# Patient Record
Sex: FEMALE | Race: WHITE | NOT HISPANIC OR LATINO | URBAN - METROPOLITAN AREA
[De-identification: names, ages, dates, MRNs, and addresses within clinical notes are randomized per-mention and may not be internally consistent; named-entity substitution may affect disease eponyms.]

---

## 2022-09-22 ENCOUNTER — HOSPITAL ENCOUNTER (EMERGENCY)
Facility: HOSPITAL | Age: 42
Discharge: HOME | End: 2022-09-22
Attending: EMERGENCY MEDICINE
Payer: MEDICARE

## 2022-09-22 VITALS
DIASTOLIC BLOOD PRESSURE: 67 MMHG | TEMPERATURE: 98.7 F | RESPIRATION RATE: 17 BRPM | OXYGEN SATURATION: 98 % | SYSTOLIC BLOOD PRESSURE: 118 MMHG | HEART RATE: 89 BPM

## 2022-09-22 DIAGNOSIS — K02.9 PAIN DUE TO DENTAL CARIES: ICD-10-CM

## 2022-09-22 DIAGNOSIS — K04.7 DENTAL INFECTION: Primary | ICD-10-CM

## 2022-09-22 DIAGNOSIS — K08.89 TOOTHACHE: ICD-10-CM

## 2022-09-22 PROCEDURE — 99284 EMERGENCY DEPT VISIT MOD MDM: CPT | Performed by: PHYSICIAN ASSISTANT

## 2022-09-22 PROCEDURE — 99282 EMERGENCY DEPT VISIT SF MDM: CPT

## 2022-09-22 RX ORDER — AMOXICILLIN 500 MG/1
500 CAPSULE ORAL 3 TIMES DAILY
Qty: 21 CAPSULE | Refills: 0 | Status: SHIPPED | OUTPATIENT
Start: 2022-09-22 | End: 2022-09-29

## 2022-09-22 RX ORDER — IBUPROFEN 600 MG/1
600 TABLET ORAL EVERY 6 HOURS PRN
Qty: 30 TABLET | Refills: 0 | Status: SHIPPED | OUTPATIENT
Start: 2022-09-22 | End: 2022-10-02

## 2022-09-22 RX ORDER — LIDOCAINE HYDROCHLORIDE 20 MG/ML
SOLUTION OROPHARYNGEAL
Qty: 100 ML | Refills: 0 | Status: SHIPPED | OUTPATIENT
Start: 2022-09-22

## 2022-09-22 NOTE — ED PROVIDER NOTES
History  Chief Complaint   Patient presents with    Dental Pain     Pt reports cracked tooth and having dental abcess underneath it  Reports pain to area  Denies fevers  Appt with oral surgeon on 10/14  Right lower dental pain x2 days, taking tylenol and ibuprofen - with relief  Concerned its getting infected  Has FU with oral surgery in oct  None       History reviewed  No pertinent past medical history  Past Surgical History:   Procedure Laterality Date    CHOLECYSTECTOMY         History reviewed  No pertinent family history  I have reviewed and agree with the history as documented  E-Cigarette/Vaping    E-Cigarette Use Never User      E-Cigarette/Vaping Substances    Nicotine No     THC No     CBD No     Flavoring No     Unknown No      Social History     Tobacco Use    Smoking status: Current Some Day Smoker     Packs/day: 0 25     Types: Cigarettes    Smokeless tobacco: Never Used   Vaping Use    Vaping Use: Never used   Substance Use Topics    Alcohol use: Not Currently    Drug use: Never       Review of Systems   HENT: Positive for dental problem  Respiratory: Negative  Cardiovascular: Negative  Gastrointestinal: Negative  All other systems reviewed and are negative  Physical Exam  Physical Exam  Vitals and nursing note reviewed  Constitutional:       Appearance: She is well-developed  HENT:      Head: Normocephalic and atraumatic  Right Ear: External ear normal       Left Ear: External ear normal       Mouth/Throat:      Dentition: Dental caries present  Comments: Teeth in poor repair  Eyes:      Conjunctiva/sclera: Conjunctivae normal    Cardiovascular:      Rate and Rhythm: Normal rate and regular rhythm  Heart sounds: Normal heart sounds  Pulmonary:      Effort: Pulmonary effort is normal       Breath sounds: Normal breath sounds  Abdominal:      General: Bowel sounds are normal       Palpations: Abdomen is soft  Musculoskeletal:         General: Normal range of motion  Cervical back: Neck supple  Lymphadenopathy:      Cervical: No cervical adenopathy  Skin:     General: Skin is warm  Findings: No rash  Neurological:      Mental Status: She is alert     Psychiatric:         Behavior: Behavior normal          Vital Signs  ED Triage Vitals   Temperature Pulse Respirations Blood Pressure SpO2   09/22/22 0913 09/22/22 0908 09/22/22 0908 09/22/22 0921 09/22/22 0908   98 7 °F (37 1 °C) 89 17 118/67 98 %      Temp Source Heart Rate Source Patient Position - Orthostatic VS BP Location FiO2 (%)   09/22/22 0913 09/22/22 0908 -- -- --   Oral Monitor         Pain Score       09/22/22 0908       No Pain           Vitals:    09/22/22 0908 09/22/22 0921   BP:  118/67   Pulse: 89          Visual Acuity      ED Medications  Medications - No data to display    Diagnostic Studies  Results Reviewed     None                 No orders to display              Procedures  Procedures         ED Course                                             MDM  Number of Diagnoses or Management Options  Dental infection: new and does not require workup  Pain due to dental caries: new and does not require workup  Toothache: new and does not require workup  Diagnosis management comments: Offered pain meds pt declines    Patient Progress  Patient progress: stable      Disposition  Final diagnoses:   Dental infection   Toothache   Pain due to dental caries     Time reflects when diagnosis was documented in both MDM as applicable and the Disposition within this note     Time User Action Codes Description Comment    9/22/2022  9:19 AM Ingrid Govea [K04 7] Dental infection     9/22/2022  9:19 AM Ingrid Govea [K08 89] Toothache     9/22/2022  9:19 AM Ingrid Govea [K02 9] Pain due to dental caries       ED Disposition     ED Disposition   Discharge    Condition   Stable    Date/Time   Thu Sep 22, 2022  9:19 AM    Comment   Anna Gamboa Benigno discharge to home/self care  Follow-up Information     Follow up With Specialties Details Why Contact Info Additional [address] [facility]   [address] [phone]  [address] [facility] [address], [address] [facility], [place], [place], [address], [address] [facility] [place]    [address] [facility]  [place] [address]           Discharge Medication List as of 9/22/2022  9:21 AM      START taking these medications    Details   amoxicillin (AMOXIL) 500 mg capsule Take 1 capsule (500 mg total) by mouth 3 (three) times a day for 7 days, Starting u 9/22/2022, Until u 9/29/2022, Print      ibuprofen (MOTRIN) 600 mg tablet Take 1 tablet (600 mg total) by mouth every 6 (six) hours as needed for mild pain for up to 10 days, Starting u 9/22/2022, Until Sun 10/2/2022 at 2359, Print      Lidocaine Viscous HCl (XYLOCAINE) 2 % mucosal solution Apply small amount to your tooth as needed  , Print             No discharge procedures on file      PDMP Review     None          ED Provider  Electronically Signed by           Lala Benito PA-C  09/22/22 7627

## 2023-01-29 ENCOUNTER — HOSPITAL ENCOUNTER (EMERGENCY)
Facility: HOSPITAL | Age: 43
Discharge: HOME | End: 2023-01-29
Attending: EMERGENCY MEDICINE

## 2023-01-29 ENCOUNTER — APPOINTMENT (EMERGENCY)
Dept: CT IMAGING | Facility: HOSPITAL | Age: 43
End: 2023-01-29

## 2023-01-29 VITALS
HEART RATE: 66 BPM | DIASTOLIC BLOOD PRESSURE: 68 MMHG | SYSTOLIC BLOOD PRESSURE: 121 MMHG | OXYGEN SATURATION: 99 % | TEMPERATURE: 98 F | WEIGHT: 187.39 LBS | RESPIRATION RATE: 16 BRPM

## 2023-01-29 DIAGNOSIS — L03.211 FACIAL CELLULITIS: Primary | ICD-10-CM

## 2023-01-29 DIAGNOSIS — K04.7 DENTAL INFECTION: ICD-10-CM

## 2023-01-29 DIAGNOSIS — K08.89 DENTALGIA: ICD-10-CM

## 2023-01-29 LAB
ALBUMIN SERPL BCP-MCNC: 4.2 G/DL (ref 3.5–5)
ALP SERPL-CCNC: 36 U/L (ref 34–104)
ALT SERPL W P-5'-P-CCNC: 9 U/L (ref 7–52)
ANION GAP SERPL CALCULATED.3IONS-SCNC: 7 MMOL/L (ref 4–13)
APTT PPP: 27 SECONDS (ref 23–37)
AST SERPL W P-5'-P-CCNC: 13 U/L (ref 13–39)
BASOPHILS # BLD AUTO: 0.09 THOUSANDS/ÂΜL (ref 0–0.1)
BASOPHILS NFR BLD AUTO: 1 % (ref 0–1)
BILIRUB SERPL-MCNC: 0.4 MG/DL (ref 0.2–1)
BUN SERPL-MCNC: 9 MG/DL (ref 5–25)
CALCIUM SERPL-MCNC: 9 MG/DL (ref 8.4–10.2)
CHLORIDE SERPL-SCNC: 106 MMOL/L (ref 96–108)
CO2 SERPL-SCNC: 24 MMOL/L (ref 21–32)
CREAT SERPL-MCNC: 0.55 MG/DL (ref 0.6–1.3)
EOSINOPHIL # BLD AUTO: 0.27 THOUSAND/ÂΜL (ref 0–0.61)
EOSINOPHIL NFR BLD AUTO: 3 % (ref 0–6)
ERYTHROCYTE [DISTWIDTH] IN BLOOD BY AUTOMATED COUNT: 20.1 % (ref 11.6–15.1)
GFR SERPL CREATININE-BSD FRML MDRD: 117 ML/MIN/1.73SQ M
GLUCOSE SERPL-MCNC: 88 MG/DL (ref 65–140)
HCT VFR BLD AUTO: 36.3 % (ref 34.8–46.1)
HGB BLD-MCNC: 11.1 G/DL (ref 11.5–15.4)
IMM GRANULOCYTES # BLD AUTO: 0.02 THOUSAND/UL (ref 0–0.2)
IMM GRANULOCYTES NFR BLD AUTO: 0 % (ref 0–2)
INR PPP: 1.07 (ref 0.84–1.19)
LACTATE SERPL-SCNC: 0.8 MMOL/L (ref 0.5–2)
LYMPHOCYTES # BLD AUTO: 1.28 THOUSANDS/ÂΜL (ref 0.6–4.47)
LYMPHOCYTES NFR BLD AUTO: 13 % (ref 14–44)
MCH RBC QN AUTO: 22.2 PG (ref 26.8–34.3)
MCHC RBC AUTO-ENTMCNC: 30.6 G/DL (ref 31.4–37.4)
MCV RBC AUTO: 73 FL (ref 82–98)
MONOCYTES # BLD AUTO: 0.83 THOUSAND/ÂΜL (ref 0.17–1.22)
MONOCYTES NFR BLD AUTO: 8 % (ref 4–12)
NEUTROPHILS # BLD AUTO: 7.34 THOUSANDS/ÂΜL (ref 1.85–7.62)
NEUTS SEG NFR BLD AUTO: 75 % (ref 43–75)
NRBC BLD AUTO-RTO: 0 /100 WBCS
PLATELET # BLD AUTO: 466 THOUSANDS/UL (ref 149–390)
PMV BLD AUTO: 9.1 FL (ref 8.9–12.7)
POTASSIUM SERPL-SCNC: 4.2 MMOL/L (ref 3.5–5.3)
PROCALCITONIN SERPL-MCNC: <0.05 NG/ML
PROT SERPL-MCNC: 7.1 G/DL (ref 6.4–8.4)
PROTHROMBIN TIME: 13.9 SECONDS (ref 11.6–14.5)
RBC # BLD AUTO: 4.99 MILLION/UL (ref 3.81–5.12)
SODIUM SERPL-SCNC: 137 MMOL/L (ref 135–147)
WBC # BLD AUTO: 9.83 THOUSAND/UL (ref 4.31–10.16)

## 2023-01-29 RX ORDER — IBUPROFEN 600 MG/1
600 TABLET ORAL EVERY 6 HOURS PRN
Qty: 30 TABLET | Refills: 0 | Status: SHIPPED | OUTPATIENT
Start: 2023-01-29 | End: 2023-02-28

## 2023-01-29 RX ORDER — AMOXICILLIN AND CLAVULANATE POTASSIUM 875; 125 MG/1; MG/1
1 TABLET, FILM COATED ORAL EVERY 12 HOURS
Qty: 14 TABLET | Refills: 0 | Status: SHIPPED | OUTPATIENT
Start: 2023-01-29 | End: 2023-02-05

## 2023-01-29 RX ORDER — KETOROLAC TROMETHAMINE 30 MG/ML
30 INJECTION, SOLUTION INTRAMUSCULAR; INTRAVENOUS ONCE
Status: COMPLETED | OUTPATIENT
Start: 2023-01-29 | End: 2023-01-29

## 2023-01-29 RX ORDER — OXYCODONE HYDROCHLORIDE 5 MG/1
5 TABLET ORAL EVERY 6 HOURS PRN
Qty: 8 TABLET | Refills: 0 | Status: SHIPPED | OUTPATIENT
Start: 2023-01-29 | End: 2023-02-08

## 2023-01-29 RX ADMIN — IOHEXOL 85 ML: 350 INJECTION, SOLUTION INTRAVENOUS at 11:39

## 2023-01-29 RX ADMIN — SODIUM CHLORIDE 1000 ML: 0.9 INJECTION, SOLUTION INTRAVENOUS at 10:38

## 2023-01-29 RX ADMIN — SODIUM CHLORIDE 3 G: 9 INJECTION, SOLUTION INTRAVENOUS at 10:37

## 2023-01-29 RX ADMIN — KETOROLAC TROMETHAMINE 30 MG: 30 INJECTION, SOLUTION INTRAMUSCULAR; INTRAVENOUS at 10:37

## 2023-01-29 NOTE — ED PROVIDER NOTES
History  Chief Complaint   Patient presents with   • Dental Pain     Pt reports she went to [facility] yesterday for dental abscess on right side of face and she had some welling at that time  Started taking abx yesterday but today the swelling is worse today  40y F here for evaluation of worsening pain / swelling to the right side of her face  Reported she saw [facility] yesterday and dxd w/ dental abscess and started on penicillin 500mg bid  Had two doses yesterday and one today, but reports swelling has increased and is now going down into her throat / neck region  +subjective fever, no chills/sweats, no sig ha  C/o 9/10 pain to the right side of the face/lower jaw and down into the right side of the neck  Pain worse when she tries to open her mouth or swallow but denies any trouble swallowing  Denies any trouble breathing  Denies cp/pressure, no sob/paula  No hx of recurrent infections/antibiotic resistant infections  History provided by:  Patient   used: No    Dental Pain  Location:  Lower  Lower teeth location:  31/RL 2nd molar  Quality:  Pulsating and sharp  Severity:  Severe  Onset quality:  Gradual  Timing:  Constant  Progression:  Worsening  Chronicity:  New  Context: abscess    Relieved by:  Nothing  Worsened by:  Jaw movement and pressure  Ineffective treatments:  Acetaminophen  Associated symptoms: facial pain, facial swelling, fever (subjective), neck pain (anterior), neck swelling (anterior) and trismus (mild 2 cm)    Associated symptoms: no difficulty swallowing, no drooling and no headaches        Prior to Admission Medications   Prescriptions Last Dose Informant Patient Reported? Taking? Lidocaine Viscous HCl (XYLOCAINE) 2 % mucosal solution   No No   Sig: Apply small amount to your tooth as needed     ibuprofen (MOTRIN) 600 mg tablet   No No   Sig: Take 1 tablet (600 mg total) by mouth every 6 (six) hours as needed for mild pain for up to 10 days      Facility-Administered Medications: None       History reviewed  No pertinent past medical history  Past Surgical History:   Procedure Laterality Date   • CHOLECYSTECTOMY         History reviewed  No pertinent family history  I have reviewed and agree with the history as documented  E-Cigarette/Vaping   • E-Cigarette Use Never User      E-Cigarette/Vaping Substances   • Nicotine No    • THC No    • CBD No    • Flavoring No    • Unknown No      Social History     Tobacco Use   • Smoking status: Some Days     Packs/day: 0 25     Types: Cigarettes   • Smokeless tobacco: Never   Vaping Use   • Vaping Use: Never used   Substance Use Topics   • Alcohol use: Not Currently   • Drug use: Never       Review of Systems   Constitutional: Positive for fever (subjective)  HENT: Positive for facial swelling  Negative for drooling  Musculoskeletal: Positive for neck pain (anterior)  Neurological: Negative for headaches  All other systems reviewed and are negative  Physical Exam  Physical Exam  Vitals and nursing note reviewed  Constitutional:       Appearance: Normal appearance  HENT:      Head:        Nose: Nose normal       Mouth/Throat:      Mouth: Mucous membranes are moist       Dentition: Abnormal dentition  Dental caries present  Tongue: No lesions  Tongue does not deviate from midline  Eyes:      Conjunctiva/sclera: Conjunctivae normal    Neck:      Comments: Swelling on the right into the submandibular space  Cardiovascular:      Rate and Rhythm: Regular rhythm  Tachycardia present  Heart sounds: No murmur heard  Pulmonary:      Effort: Pulmonary effort is normal       Breath sounds: Normal breath sounds  Musculoskeletal:         General: No swelling  Cervical back: Normal range of motion  Skin:     General: Skin is warm  Findings: Erythema present  Neurological:      General: No focal deficit present  Mental Status: She is alert     Psychiatric:         Mood and Affect: Mood normal  Vital Signs  ED Triage Vitals   Temperature Pulse Respirations Blood Pressure SpO2   01/29/23 0918 01/29/23 0918 01/29/23 0918 01/29/23 0921 01/29/23 0918   98 °F (36 7 °C) (!) 123 16 113/73 98 %      Temp Source Heart Rate Source Patient Position - Orthostatic VS BP Location FiO2 (%)   01/29/23 0918 01/29/23 0918 -- -- --   Oral Monitor         Pain Score       --                  Vitals:    01/29/23 0918 01/29/23 0921   BP:  113/73   Pulse: (!) 123          Visual Acuity      ED Medications  Medications   sodium chloride 0 9 % bolus 1,000 mL (0 mL Intravenous Stopped 1/29/23 1138)   ketorolac (TORADOL) injection 30 mg (30 mg Intravenous Given 1/29/23 1037)   ampicillin-sulbactam (UNASYN) 3 g in sodium chloride 0 9 % 100 mL IVPB (0 g Intravenous Stopped 1/29/23 1107)   iohexol (OMNIPAQUE) 350 MG/ML injection (SINGLE-DOSE) 85 mL (85 mL Intravenous Given 1/29/23 1139)       Diagnostic Studies  Results Reviewed     Procedure Component Value Units Date/Time    Procalcitonin [[id number]]  (Normal) Collected: 01/29/23 1028    Lab Status: Final result Specimen: Blood from Arm, Right Updated: 01/29/23 1111     Procalcitonin <0 05 ng/ml     Comprehensive metabolic panel [[id number]]  (Abnormal) Collected: 01/29/23 1028    Lab Status: Final result Specimen: Blood from Arm, Right Updated: 01/29/23 1104     Sodium 137 mmol/L      Potassium 4 2 mmol/L      Chloride 106 mmol/L      CO2 24 mmol/L      ANION GAP 7 mmol/L      BUN 9 mg/dL      Creatinine 0 55 mg/dL      Glucose 88 mg/dL      Calcium 9 0 mg/dL      AST 13 U/L      ALT 9 U/L      Alkaline Phosphatase 36 U/L      Total Protein 7 1 g/dL      Albumin 4 2 g/dL      Total Bilirubin 0 40 mg/dL      eGFR 117 ml/min/1 73sq m     Narrative:      Jennifer guidelines for Chronic Kidney Disease (CKD):   •  Stage 1 with normal or high GFR (GFR > 90 mL/min/1 73 square meters)  •  Stage 2 Mild CKD (GFR = 60-89 mL/min/1 73 square meters)  •  Stage 3A Moderate CKD (GFR = 45-59 mL/min/1 73 square meters)  •  Stage 3B Moderate CKD (GFR = 30-44 mL/min/1 73 square meters)  •  Stage 4 Severe CKD (GFR = 15-29 mL/min/1 73 square meters)  •  Stage 5 End Stage CKD (GFR <15 mL/min/1 73 square meters)  Note: GFR calculation is accurate only with a steady state creatinine    Lactic acid [[id number]]  (Normal) Collected: 01/29/23 1028    Lab Status: Final result Specimen: Blood from Arm, Right Updated: 01/29/23 1103     LACTIC ACID 0 8 mmol/L     Narrative:      Result may be elevated if tourniquet was used during collection  Protime-INR [[id number]]  (Normal) Collected: 01/29/23 1028    Lab Status: Final result Specimen: Blood from Arm, Right Updated: 01/29/23 1059     Protime 13 9 seconds      INR 1 07    APTT [[id number]]  (Normal) Collected: 01/29/23 1028    Lab Status: Final result Specimen: Blood from Arm, Right Updated: 01/29/23 1059     PTT 27 seconds     CBC and differential [[id number]]  (Abnormal) Collected: 01/29/23 1028    Lab Status: Final result Specimen: Blood from Arm, Right Updated: 01/29/23 1040     WBC 9 83 Thousand/uL      RBC 4 99 Million/uL      Hemoglobin 11 1 g/dL      Hematocrit 36 3 %      MCV 73 fL      MCH 22 2 pg      MCHC 30 6 g/dL      RDW 20 1 %      MPV 9 1 fL      Platelets 580 Thousands/uL      nRBC 0 /100 WBCs      Neutrophils Relative 75 %      Immat GRANS % 0 %      Lymphocytes Relative 13 %      Monocytes Relative 8 %      Eosinophils Relative 3 %      Basophils Relative 1 %      Neutrophils Absolute 7 34 Thousands/µL      Immature Grans Absolute 0 02 Thousand/uL      Lymphocytes Absolute 1 28 Thousands/µL      Monocytes Absolute 0 83 Thousand/µL      Eosinophils Absolute 0 27 Thousand/µL      Basophils Absolute 0 09 Thousands/µL     Blood culture #2 [[id number]] Collected: 01/29/23 1028    Lab Status:  In process Specimen: Blood from Arm, Right Updated: 01/29/23 1036    Blood culture #1 [[id number]] Collected: 01/29/23 1028    Lab Status: In process Specimen: Blood from Arm, Right Updated: 01/29/23 1036                 CT facial bones with contrast   Final Result by Mitch Davidson MD (01/29 1202)      Diffuse right facial edema with cellulitis and phlegmon in the right perimandibular soft tissues secondary to infection of the right lower 2nd molar tooth  No associated soft tissue abscess  Extensive periodontal disease throughout the maxilla and mandible as described above  Workstation performed: [id number]                    Procedures  Procedures         ED Course  ED Course as of 01/29/23 1303   Sun Jan 29, 2023   1132 WBC: 9 83   1132 Immat GRANS %: 0   1250 CT facial bones with contrast  No focal abscess noted on CT scan  Will change pt to augmentin and have her f/u w/ [facility]   1251 PDMP reviewed, no controlled substances in the last 12m    Will change from penicillin to augmentin and give a couple of days of oxycodone for pain  Instructed on return precautions and given both the dental clinic and [facility] follow up information                                             Medical Decision Making  Right sided dental abscess, worsening  ddx dental abscess w/ extension into the soft tissues, developing ludwigs angina, parapharyngeal space infection, sepsis  Will ck sepsis labs, ct face w/ IV contrast for further eval  Will give pain meds, IV abx and re-eval       Dental infection: acute illness or injury that poses a threat to life or bodily functions     Details: no evidence of drainable abscess at thit time  will change from PCN to augmentin, educated on pain regimen, return precautions  Dentalgia: acute illness or injury  Facial cellulitis: acute illness or injury     Details: no evidence of drainable abscess at thit time  will change from PCN to augmentin, educated on pain regimen, return precautions  Amount and/or Complexity of Data Reviewed  External Data Reviewed: notes       Details: PDMP reviewed - no controlled substances in the last 12 months  Labs: ordered  Decision-making details documented in ED Course  Radiology: ordered  Decision-making details documented in ED Course  Risk  Prescription drug management  Disposition  Final diagnoses:   Facial cellulitis   Dental infection   Dentalgia     Time reflects when diagnosis was documented in both MDM as applicable and the Disposition within this note     Time User Action Codes Description Comment    1/29/2023 12:52 PM Mela Thomas Add [H27 709] Facial cellulitis     1/29/2023 12:52 PM Ibis, [address] Add [K04 7] Dental infection     1/29/2023 12:55 PM Drewjohn Talmo Add [Z01 85] MARII MAYORGA  [facility]       ED Disposition     ED Disposition   Discharge    Condition   Stable    Date/Time   Sun Jan 29, 2023 12:55 PM    [facility] discharge to home/self care                 Follow-up Information     Follow up With Specialties Details Why Contact Info Additional [address] [facility] Schedule an appointment as soon as possible for a visit in 1 week for further evaluation and treatment [address] [phone]  [facility] [place] [address], [address] [place] [address], [phone], [address] [facility]  Schedule an appointment as soon as possible for a visit in 3 days If symptoms worsen or if no improvement [address]  [phone]           Patient's Medications   Discharge Prescriptions    AMOXICILLIN-CLAVULANATE (AUGMENTIN) 875-125 MG PER TABLET    Take 1 tablet by mouth every 12 (twelve) hours for 7 days       Start Date: 1/29/2023 End Date: 2/5/2023       Order Dose: 1 tablet       Quantity: 14 tablet    Refills: 0    IBUPROFEN (MOTRIN) 600 MG TABLET    Take 1 tablet (600 mg total) by mouth every 6 (six) hours as needed for mild pain, moderate pain or fever       Start Date: 1/29/2023 End Date: 2/28/2023       Order Dose: 600 mg       Quantity: 30 tablet    Refills: 0    OXYCODONE (ROXICODONE) 5 IMMEDIATE RELEASE TABLET    Take 1 tablet (5 mg total) by mouth every 6 (six) hours as needed for severe pain for up to 10 days Max Daily Amount: 20 mg       Start Date: 1/29/2023 End Date: 2/8/2023       Order Dose: 5 mg       Quantity: 8 tablet    Refills: 0       No discharge procedures on file      PDMP Review       Value Time User    PDMP Reviewed  Yes 1/29/2023 12:51 PM Jorge Luis Bryan DO          ED Provider  Electronically Signed by           Jorge Luis Bryan DO  01/29/23 2681

## 2023-01-29 NOTE — DISCHARGE INSTRUCTIONS
You can alternate acetaminophen 1000mg and ibuprofen 600mg every 3 hours for pain  Try to time your ibuprofen so you can take it when you eat  Additionally you can apply ice for 15-20 minutes every 1-2 hours while awake for additional pain control  If you are still having significant pain despite the above measures, you can take oxycodone 5mg every 6 hours for breakthrough/severe pain  No driving / operating machinery while taking this medication   It will cause constipation, so be sure to start a stool softener and a laxative immediately  Return for any trouble breathing, persistent vomiting, fever more than 101, worsening symptoms or for any concerns

## 2023-01-29 NOTE — Clinical Note
Phuong Hutton was seen and treated in our emergency department on 1/29/2023  Diagnosis:     Mikaela Harden  may return to work on return date  She may return on this date: 01/31/2023         If you have any questions or concerns, please don't hesitate to call        Woo Lang DO    ______________________________           _______________          _______________  Hospital Representative                              Date                                Time

## 2023-02-03 LAB
BACTERIA BLD CULT: NORMAL
BACTERIA BLD CULT: NORMAL

## 2023-05-02 ENCOUNTER — HOSPITAL ENCOUNTER (EMERGENCY)
Facility: HOSPITAL | Age: 43
Discharge: HOME | End: 2023-05-02
Attending: EMERGENCY MEDICINE | Admitting: EMERGENCY MEDICINE

## 2023-05-02 VITALS
DIASTOLIC BLOOD PRESSURE: 71 MMHG | TEMPERATURE: 98.1 F | RESPIRATION RATE: 14 BRPM | WEIGHT: 180 LBS | OXYGEN SATURATION: 99 % | SYSTOLIC BLOOD PRESSURE: 129 MMHG | HEART RATE: 83 BPM

## 2023-05-02 DIAGNOSIS — K02.9 DENTAL CARIES: ICD-10-CM

## 2023-05-02 DIAGNOSIS — K04.7 DENTAL ABSCESS: Primary | ICD-10-CM

## 2023-05-02 RX ORDER — AMOXICILLIN AND CLAVULANATE POTASSIUM 875; 125 MG/1; MG/1
1 TABLET, FILM COATED ORAL ONCE
Status: COMPLETED | OUTPATIENT
Start: 2023-05-02 | End: 2023-05-02

## 2023-05-02 RX ORDER — AMOXICILLIN AND CLAVULANATE POTASSIUM 875; 125 MG/1; MG/1
1 TABLET, FILM COATED ORAL EVERY 12 HOURS
Qty: 14 TABLET | Refills: 0 | Status: SHIPPED | OUTPATIENT
Start: 2023-05-02 | End: 2023-05-09

## 2023-05-02 RX ADMIN — AMOXICILLIN AND CLAVULANATE POTASSIUM 1 TABLET: 875; 125 TABLET, FILM COATED ORAL at 09:56

## 2023-05-02 NOTE — ED PROVIDER NOTES
History  Chief Complaint   Patient presents with    Dental Problem     Broken tooth in right lower jaw  38y F here for evaluation of recurrent dental abscess  Has a broken right lower tooth that has been infected in the past, including not responding to penicillin w/ recent infection  Started to note the swelling today and that has been the first sign of a developing abscess in the past   Denies any sig pain  Denies swelling to the face or under the jaw  Denies f/c/s, no neck pain  No other complaints  Reports having an appointment 5/10 w/ [facility] for evaluation  Has acetaminophen/ibuprofen at home to take and doesn't require any pain medication at this time  History provided by:  Patient   used: No        None       No past medical history on file  Past Surgical History:   Procedure Laterality Date    CHOLECYSTECTOMY         No family history on file  I have reviewed and agree with the history as documented  E-Cigarette/Vaping    E-Cigarette Use Never User      E-Cigarette/Vaping Substances    Nicotine No     THC No     CBD No     Flavoring No     Unknown No      Social History     Tobacco Use    Smoking status: Some Days     Packs/day: 0 25     Types: Cigarettes    Smokeless tobacco: Never   Vaping Use    Vaping Use: Never used   Substance Use Topics    Alcohol use: Not Currently    Drug use: Never       Review of Systems   All other systems reviewed and are negative  Physical Exam  Physical Exam  Vitals and nursing note reviewed  Constitutional:       Appearance: Normal appearance  HENT:      Nose: Nose normal       Mouth/Throat:      Mouth: Mucous membranes are moist       Dentition: Abnormal dentition  Dental tenderness and dental caries present  Tongue: No lesions  Tongue does not deviate from midline  Pharynx: Oropharynx is clear       Eyes:      Conjunctiva/sclera: Conjunctivae normal    Cardiovascular:      Rate and Rhythm: Normal rate    Pulmonary:      Effort: Pulmonary effort is normal    Musculoskeletal:      Cervical back: No rigidity or tenderness  Lymphadenopathy:      Cervical: No cervical adenopathy  Skin:     General: Skin is warm  Findings: No erythema  Neurological:      General: No focal deficit present  Mental Status: She is alert  Psychiatric:         Mood and Affect: Mood normal          Vital Signs  ED Triage Vitals [05/02/23 0913]   Temperature Pulse Respirations Blood Pressure SpO2   98 1 °F (36 7 °C) 83 14 129/71 99 %      Temp Source Heart Rate Source Patient Position - Orthostatic VS BP Location FiO2 (%)   Oral -- -- -- --      Pain Score       5           Vitals:    05/02/23 0913   BP: 129/71   Pulse: 83         Visual Acuity      ED Medications  Medications   amoxicillin-clavulanate (AUGMENTIN) 875-125 mg per tablet 1 tablet (1 tablet Oral Given 5/2/23 0956)       Diagnostic Studies  Results Reviewed     None                 No orders to display              Procedures  Procedures         ED Course                                             Medical Decision Making  38y F w/ recurrent dental infection  ddx includes infection/abscess, ludwigs, however given exam, no evidence of ludwigs at this time    Had been without insurance of a sig amt of time, but now has insurance back and has appt coming up w/ dentist (reported 5/10)  Hx of severe infection, not responsive to penicillin  D/w pt concerns for possible resistance given hx and recommended augmentin and pt in agreement  Given 1st dose here and instructed to f/u as previously scheduled  Dental abscess: complicated acute illness or injury     Details: recurrent infection  Dental caries: chronic illness or injury with exacerbation, progression, or side effects of treatment  Amount and/or Complexity of Data Reviewed  External Data Reviewed: notes       Details: prior visit reviewed  recent [facility] visit reviewed      Risk  Prescription drug management  Disposition  Final diagnoses:   Dental abscess   Dental caries     Time reflects when diagnosis was documented in both MDM as applicable and the Disposition within this note     Time User Action Codes Description Comment    5/2/2023  9:54 AM Kimberley PALMA Add [K04 7] Dental abscess     5/2/2023  9:54 AM Kimberley PALMA Add [K02 9] Dental caries       ED Disposition     ED Disposition   Discharge    Condition   Stable    Date/Time   Tue May 2, 2023  9:54 AM    Comment   Ritchie Pérez discharge to home/self care  Follow-up Information     Follow up With Specialties Details Why [address] [facility] [address] to  as previously scheduled for further evaluation and treatment [address] [facility] [address] [facility]          Discharge Medication List as of 5/2/2023  9:56 AM      START taking these medications    Details   amoxicillin-clavulanate (AUGMENTIN) 875-125 mg per tablet Take 1 tablet by mouth every 12 (twelve) hours for 7 days, Starting Tue 5/2/2023, Until Tue 5/9/2023, Normal             No discharge procedures on file      PDMP Review       Value Time User    PDMP Reviewed  Yes 1/29/2023 12:51 PM Edda Park DO          ED Provider  Electronically Signed by           Edda Park DO  05/02/23 1011

## 2023-05-02 NOTE — DISCHARGE INSTRUCTIONS
Return for any trouble breathing, persistent vomiting, fever more than 101, worsening symptoms or for any concerns  You can alternate acetaminophen 1000mg and ibuprofen 600mg every 3 hours for pain  Try to time your ibuprofen so you can take it when you eat  Additionally you can apply ice for 15-20 minutes every 1-2 hours while awake for additional pain control